# Patient Record
Sex: FEMALE | Race: BLACK OR AFRICAN AMERICAN | Employment: FULL TIME | URBAN - METROPOLITAN AREA
[De-identification: names, ages, dates, MRNs, and addresses within clinical notes are randomized per-mention and may not be internally consistent; named-entity substitution may affect disease eponyms.]

---

## 2020-11-16 ENCOUNTER — TELEPHONE (OUTPATIENT)
Dept: INTERNAL MEDICINE CLINIC | Age: 65
End: 2020-11-16

## 2020-11-16 NOTE — TELEPHONE ENCOUNTER
----- Message from Mahalia Kanner sent at 11/16/2020  2:02 PM EST -----  Subject: Appointment Request    Reason for Call: New Patient Request Appointment    QUESTIONS  Type of Appointment? New Patient/New to Provider  Reason for appointment request? Available appointments did not meet   patient need  Additional Information for Provider? Pt needs a new patient appointment as   soon as possible due to being out of blood pressure medication. Pt would   like a callback. Pt has availability any day besides wednesday 11/18  ---------------------------------------------------------------------------  --------------  CALL BACK INFO  What is the best way for the office to contact you? OK to leave message on   voicemail  Preferred Call Back Phone Number? [phone]  ---------------------------------------------------------------------------  --------------  SCRIPT ANSWERS  Relationship to Patient? Self  Appointment reason? Establish Care/Find a provider  Have you been diagnosed with   tested for   or told that you are suspected of having COVID-19 (Coronavirus)? No  Have you had a fever or taken medication to treat a fever within the past   3 days? No  Have you had a cough   shortness of breath or flu-like symptoms within the past 3 days? No  Do you currently have flu-like symptoms including fever or chills   cough   shortness of breath   or difficulty breathing   or new loss of taste or smell? No  (Service Expert  click yes below to proceed with [email or web address] As Usual   Scheduling)?  Yes

## 2020-11-18 ENCOUNTER — TELEPHONE (OUTPATIENT)
Dept: PRIMARY CARE CLINIC | Age: 65
End: 2020-11-18

## 2020-11-19 NOTE — TELEPHONE ENCOUNTER
----- Message from Ani Pedroza sent at 11/18/2020  4:00 PM EST -----  Subject: Message to Provider    QUESTIONS  Information for Provider? Patient called wanting to schedule a blood   pressure check and wanted an in person visit. Patient is an establishing   patient and is requesting to be seen by next week.   ---------------------------------------------------------------------------  --------------  CALL BACK INFO  What is the best way for the office to contact you? OK to leave message on   voicemail  Preferred Call Back Phone Number? [phone]  ---------------------------------------------------------------------------  --------------  SCRIPT ANSWERS  Relationship to Patient?  Self

## 2020-11-23 ENCOUNTER — OFFICE VISIT (OUTPATIENT)
Dept: INTERNAL MEDICINE CLINIC | Age: 65
End: 2020-11-23
Payer: COMMERCIAL

## 2020-11-23 VITALS
SYSTOLIC BLOOD PRESSURE: 138 MMHG | TEMPERATURE: 97.2 F | OXYGEN SATURATION: 99 % | WEIGHT: 217.25 LBS | BODY MASS INDEX: 34.1 KG/M2 | HEART RATE: 76 BPM | HEIGHT: 67 IN | DIASTOLIC BLOOD PRESSURE: 87 MMHG

## 2020-11-23 DIAGNOSIS — E78.00 PURE HYPERCHOLESTEROLEMIA: ICD-10-CM

## 2020-11-23 DIAGNOSIS — Z11.59 ENCOUNTER FOR HEPATITIS C SCREENING TEST FOR LOW RISK PATIENT: ICD-10-CM

## 2020-11-23 DIAGNOSIS — I10 ESSENTIAL HYPERTENSION: ICD-10-CM

## 2020-11-23 PROBLEM — Z28.21 REFUSED PNEUMOCOCCAL VACCINATION: Status: ACTIVE | Noted: 2020-11-23

## 2020-11-23 PROBLEM — Z28.21 REFUSED INFLUENZA VACCINE: Status: ACTIVE | Noted: 2020-11-23

## 2020-11-23 PROBLEM — J45.20 MILD INTERMITTENT ASTHMA WITHOUT COMPLICATION: Status: ACTIVE | Noted: 2020-11-23

## 2020-11-23 PROBLEM — R60.9 EDEMA: Status: ACTIVE | Noted: 2020-11-23

## 2020-11-23 LAB — HEPATITIS C ANTIBODY INTERPRETATION: NORMAL

## 2020-11-23 PROCEDURE — 99204 OFFICE O/P NEW MOD 45 MIN: CPT | Performed by: INTERNAL MEDICINE

## 2020-11-23 RX ORDER — HYDROCHLOROTHIAZIDE 25 MG/1
25 TABLET ORAL DAILY
Qty: 90 TABLET | Refills: 3 | Status: SHIPPED | OUTPATIENT
Start: 2020-11-23 | End: 2022-01-12 | Stop reason: SDUPTHER

## 2020-11-23 RX ORDER — ALBUTEROL SULFATE 90 UG/1
2 AEROSOL, METERED RESPIRATORY (INHALATION) EVERY 4 HOURS PRN
COMMUNITY
Start: 2020-03-11

## 2020-11-23 RX ORDER — HYDROCHLOROTHIAZIDE 25 MG/1
25 TABLET ORAL DAILY
COMMUNITY
Start: 2020-09-11 | End: 2020-11-23 | Stop reason: SDUPTHER

## 2020-11-23 SDOH — HEALTH STABILITY: MENTAL HEALTH: HOW OFTEN DO YOU HAVE A DRINK CONTAINING ALCOHOL?: NEVER

## 2020-11-23 ASSESSMENT — PATIENT HEALTH QUESTIONNAIRE - PHQ9
2. FEELING DOWN, DEPRESSED OR HOPELESS: 0
SUM OF ALL RESPONSES TO PHQ QUESTIONS 1-9: 0
1. LITTLE INTEREST OR PLEASURE IN DOING THINGS: 0
SUM OF ALL RESPONSES TO PHQ QUESTIONS 1-9: 0
SUM OF ALL RESPONSES TO PHQ9 QUESTIONS 1 & 2: 0
SUM OF ALL RESPONSES TO PHQ QUESTIONS 1-9: 0

## 2020-11-23 NOTE — PROGRESS NOTES
LABA1C  No results found for: EAG    I have extensively reviewed and reconciled the medication list, discontinued medications not taking or no longer appropriate, and updated the active meds list    Prior to Visit Medications    Not on File          ROS: 12 systems reviewed, as documented by MA    History reviewed. No pertinent past medical history. History reviewed. No pertinent surgical history. Social History     Socioeconomic History    Marital status: Not on file     Spouse name: Not on file    Number of children: Not on file    Years of education: Not on file    Highest education level: Not on file   Occupational History    Not on file   Social Needs    Financial resource strain: Not on file    Food insecurity     Worry: Not on file     Inability: Not on file    Transportation needs     Medical: Not on file     Non-medical: Not on file   Tobacco Use    Smoking status: Never Smoker    Smokeless tobacco: Never Used   Substance and Sexual Activity    Alcohol use: Never     Frequency: Never    Drug use: Never    Sexual activity: Not Currently   Lifestyle    Physical activity     Days per week: Not on file     Minutes per session: Not on file    Stress: Not on file   Relationships    Social connections     Talks on phone: Not on file     Gets together: Not on file     Attends Buddhist service: Not on file     Active member of club or organization: Not on file     Attends meetings of clubs or organizations: Not on file     Relationship status: Not on file    Intimate partner violence     Fear of current or ex partner: Not on file     Emotionally abused: Not on file     Physically abused: Not on file     Forced sexual activity: Not on file   Other Topics Concern    Not on file   Social History Narrative    Not on file       History reviewed. No pertinent family history.       Health Maintenance Due   Topic Date Due    Potassium monitoring  1955    Creatinine monitoring  1955    Hepatitis C screen  1955    DTaP/Tdap/Td vaccine (1 - Tdap) 11/20/1973    Lipid screen  11/20/1994    Diabetes screen  11/20/1994    Breast cancer screen  11/20/2004    Shingles Vaccine (1 of 2) 11/20/2004    Colon cancer screen colonoscopy  11/20/2004    DEXA (modify frequency per FRAX score)  11/20/2009    Pneumococcal 65+ years Vaccine (1 of 1 - PPSV23) 11/20/2019    Flu vaccine (1) 09/01/2020         BP (!) 146/87 (Site: Right Upper Arm, Position: Sitting)   Pulse 76   Temp 97.2 °F (36.2 °C) (Infrared)   Ht 5' 6.54\" (1.69 m)   Wt 217 lb 4 oz (98.5 kg)   SpO2 99%   BMI 34.50 kg/m²   Body mass index is 34.5 kg/m². Physical Exam  Constitutional:       General: She is not in acute distress. HENT:      Head: Normocephalic and atraumatic. Nose: Nose normal.      Mouth/Throat:      Pharynx: No oropharyngeal exudate. Eyes:      General: No scleral icterus. Right eye: No discharge. Left eye: No discharge. Conjunctiva/sclera: Conjunctivae normal.      Pupils: Pupils are equal, round, and reactive to light. Neck:      Musculoskeletal: Neck supple. Thyroid: No thyromegaly. Vascular: No JVD. Trachea: No tracheal deviation. Cardiovascular:      Rate and Rhythm: Normal rate and regular rhythm. Heart sounds: Normal heart sounds. No murmur. No friction rub. No gallop. Pulmonary:      Effort: Pulmonary effort is normal. No respiratory distress. Breath sounds: Normal breath sounds. No wheezing or rales. Chest:      Chest wall: No tenderness. Abdominal:      General: Bowel sounds are normal. There is no distension. Palpations: Abdomen is soft. There is no mass. Tenderness: There is no abdominal tenderness. There is no guarding or rebound. Musculoskeletal: Normal range of motion. General: No tenderness. Lymphadenopathy:      Cervical: No cervical adenopathy. Skin:     General: Skin is warm and dry.       Coloration: Skin is not pale. Findings: No erythema or rash. Neurological:      Mental Status: She is alert and oriented to person, place, and time. Cranial Nerves: No cranial nerve deficit. Motor: No abnormal muscle tone. Coordination: Coordination normal.      Deep Tendon Reflexes: Reflexes are normal and symmetric. Reflexes normal.   Psychiatric:         Judgment: Judgment normal.         ASSESSMENT AND PLANS:      Except as noted below, all chronic problems have been reviewed and are stable to continue medications or other therapy as previously documented in the patient's chart, with changes per orders or documentation below:        Assessment and Plan: Patient received counseling and, if relevant,printed instructions for all symptoms listed in CC and HPI, as well as for all diagnoses brought onto today's visit note below. Typical counseling includes, but is not limited to, non pharmacologic measures to manage listed symptoms and conditions; appropriate use, risks and benefits for all prescribed medications; potential interactions between medications both prescribed and OTC; diet; exercise; healthy behaviors; and goalsetting to improve health. Pt.or responsible party was involved in shared decision making and had opportunity to have all questions answered. 1. Essential hypertension--patient reports that she had good control with just hydrochlorothiazide we will plan to restart that and then she will get a home blood pressure monitor and will do a virtual follow-up in 1 month to ensure that she comes under good control. I did advise her that I anticipate she might need additional medication given her readings today.  - COMPREHENSIVE METABOLIC PANEL; Future  - TSH WITH REFLEX TO FT4; Future  - HEMOGLOBIN A1C; Future    2. Pure hypercholesterolemia  - LIPID PANEL; Future    3. Personal history of venous thrombosis and embolism    4. Localized edema    5. Mild intermittent asthma without complication    6. Refused pneumococcal vaccination    7. Refused influenza vaccine    8. Encounter for screening mammogram for malignant neoplasm of breast  - AQUILES DIGITAL SCREEN W OR WO CAD BILATERAL; Future    9. Postmenopausal  - DEXA BONE DENSITY 2 SITES; Future    10. Encounter for hepatitis C screening test for low risk patient  - HEPATITIS C ANTIBODY; Future    11. Screen for colon cancer  - Cologuard (For External Results Only); Future            Problem List     HTN (hypertension) - Primary    Relevant Orders    COMPREHENSIVE METABOLIC PANEL    TSH WITH REFLEX TO FT4    HEMOGLOBIN A1C    Edema    Personal history of venous thrombosis and embolism    Pure hypercholesterolemia    Relevant Medications    hydroCHLOROthiazide (HYDRODIURIL) 25 MG tablet    Other Relevant Orders    LIPID PANEL    Mild intermittent asthma without complication    Relevant Medications    albuterol sulfate  (90 Base) MCG/ACT inhaler    Refused pneumococcal vaccination    Refused influenza vaccine        Orders Placed This Encounter   Procedures    Cologuard (For External Results Only)     This test is performed by an external laboratory and is used for result attachment only. It is required that this order requisition be faxed to: [facility] @ [phone]. See [email or web address] ([email or web address] for further information.      Standing Status:   Future     Standing Expiration Date:   11/23/2021    DEXA BONE DENSITY 2 SITES     Standing Status:   Future     Standing Expiration Date:   11/23/2021    AQUILES DIGITAL SCREEN W OR WO CAD BILATERAL     Standing Status:   Future     Standing Expiration Date:   1/23/2022    LIPID PANEL     Standing Status:   Future     Standing Expiration Date:   11/23/2021     Order Specific Question:   Is Patient Fasting?/# of Hours     Answer:   yes    COMPREHENSIVE METABOLIC PANEL     Standing Status:   Future     Standing Expiration Date:   11/23/2021    TSH WITH REFLEX TO FT4     Standing Status:   Future     Standing Expiration Date:   11/23/2021    HEMOGLOBIN A1C     Standing Status:   Future     Standing Expiration Date:   11/23/2021    HEPATITIS C ANTIBODY     Standing Status:   Future     Standing Expiration Date:   11/23/2021       I have reconciled the medications in chart with what patient reports to be taking, andreviewed action/ sideeffects and how to take any new medications. Patient/caregiver understands purpose and side effects. A complete  list of medications was provided in their after-visit summary. Return in about 1 month (around 12/23/2020) for bp recheck VV. Time basedbilling: I spent over *45 minutes with this patient, and as is the nature of primary care and typical for my extended visits, over 50 percent of this visit was spent on counseling and coordination ofcare.

## 2020-11-24 LAB
A/G RATIO: 1.3 (ref 1.1–2.2)
ALBUMIN SERPL-MCNC: 4.1 G/DL (ref 3.4–5)
ALP BLD-CCNC: 64 U/L (ref 40–129)
ALT SERPL-CCNC: 16 U/L (ref 10–40)
ANION GAP SERPL CALCULATED.3IONS-SCNC: 13 MMOL/L (ref 3–16)
AST SERPL-CCNC: 18 U/L (ref 15–37)
BILIRUB SERPL-MCNC: 0.8 MG/DL (ref 0–1)
BUN BLDV-MCNC: 14 MG/DL (ref 7–20)
CALCIUM SERPL-MCNC: 9.9 MG/DL (ref 8.3–10.6)
CHLORIDE BLD-SCNC: 104 MMOL/L (ref 99–110)
CHOLESTEROL, TOTAL: 258 MG/DL (ref 0–199)
CO2: 29 MMOL/L (ref 21–32)
CREAT SERPL-MCNC: 0.8 MG/DL (ref 0.6–1.2)
ESTIMATED AVERAGE GLUCOSE: 91.1 MG/DL
GFR AFRICAN AMERICAN: >60
GFR NON-AFRICAN AMERICAN: >60
GLOBULIN: 3.1 G/DL
GLUCOSE BLD-MCNC: 95 MG/DL (ref 70–99)
HBA1C MFR BLD: 4.8 %
HDLC SERPL-MCNC: 46 MG/DL (ref 40–60)
LDL CHOLESTEROL CALCULATED: 177 MG/DL
POTASSIUM SERPL-SCNC: 4.6 MMOL/L (ref 3.5–5.1)
SODIUM BLD-SCNC: 146 MMOL/L (ref 136–145)
TOTAL PROTEIN: 7.2 G/DL (ref 6.4–8.2)
TRIGL SERPL-MCNC: 174 MG/DL (ref 0–150)
TSH REFLEX FT4: 1.04 UIU/ML (ref 0.27–4.2)
VLDLC SERPL CALC-MCNC: 35 MG/DL

## 2020-12-28 ENCOUNTER — VIRTUAL VISIT (OUTPATIENT)
Dept: INTERNAL MEDICINE CLINIC | Age: 65
End: 2020-12-28
Payer: COMMERCIAL

## 2020-12-28 PROCEDURE — 99212 OFFICE O/P EST SF 10 MIN: CPT | Performed by: INTERNAL MEDICINE

## 2020-12-28 NOTE — PROGRESS NOTES
Chief Complaint   Patient presents with    Hypertension       HPI: Virtual visit via doxy. me during covid-19 pandemic for bp followup. However, she never got around to purchasing a home monitor. States is feeling well. States adherent to DASH diet. ROS (1+): no cough    Medications reviewed and reconciled with what patient reports to be taking. There were no vitals taken for this visit. Physical Exam well appearing    ASSESSMENT/PLAN: Pt received counseling and, if relevant, printed instructions for all symptoms listed in CC and HPI, as well as for all diagnoses listed below. 1. Essential hypertension--needs to schedule inperson visit since she doesn't have a home monitor. Will need to get one for long term as well. Problem List Items Addressed This Visit     HTN (hypertension) - Primary            Return in about 1 day (around 12/29/2020) for bp recheck in person--doesnt have home monitor. Maria Elena Weems is a 76 y.o. female being evaluated by a Virtual Visit (video visit) encounter to address concerns as mentioned above. A caregiver was present when appropriate. Due to this being a TeleHealth encounter (During UDJDS-76 public health emergency), evaluation of the following organ systems was limited: Vitals/Constitutional/EENT/Resp/CV/GI//MS/Neuro/Skin/Heme-Lymph-Imm. Pursuant to the emergency declaration under the [facility], [address] [place] [address] authority and the [employer] Act, this Virtual Visit was conducted with patient's (and/or legal guardian's) consent, to reduce the patient's risk of exposure to COVID-19 and provide necessary medical care. The patient (and/or legal guardian) has also been advised to contact this office for worsening conditions or problems, and seek emergency medical treatment and/or call 911 if deemed necessary. Patient identification was verified at the start of the visit: Yes    Total time spent for this encounter: Not billed by time    Services were provided through a video synchronous discussion virtually to substitute for in-person clinic visit. Patient and provider were located at their individual homes. --Roni Awad MD on 12/28/2020 at 3:28 PM    An electronic signature was used to authenticate this note.

## 2021-02-26 ENCOUNTER — PATIENT MESSAGE (OUTPATIENT)
Dept: PRIMARY CARE CLINIC | Age: 66
End: 2021-02-26

## 2021-02-26 ENCOUNTER — HOSPITAL ENCOUNTER (OUTPATIENT)
Age: 66
Discharge: HOME | End: 2021-02-26
Payer: COMMERCIAL

## 2021-02-26 ENCOUNTER — HOSPITAL ENCOUNTER (OUTPATIENT)
Dept: GENERAL RADIOLOGY | Age: 66
Discharge: HOME | End: 2021-02-26
Payer: COMMERCIAL

## 2021-02-26 ENCOUNTER — VIRTUAL VISIT (OUTPATIENT)
Dept: PRIMARY CARE CLINIC | Age: 66
End: 2021-02-26
Payer: COMMERCIAL

## 2021-02-26 DIAGNOSIS — Z20.822 SUSPECTED COVID-19 VIRUS INFECTION: Primary | ICD-10-CM

## 2021-02-26 DIAGNOSIS — R07.89 TIGHTNESS IN CHEST: ICD-10-CM

## 2021-02-26 PROCEDURE — 71046 X-RAY EXAM CHEST 2 VIEWS: CPT

## 2021-02-26 PROCEDURE — 99213 OFFICE O/P EST LOW 20 MIN: CPT | Performed by: NURSE PRACTITIONER

## 2021-02-26 RX ORDER — ZINC SULFATE 50(220)MG
50 CAPSULE ORAL DAILY
COMMUNITY
Start: 2021-02-26

## 2021-02-26 RX ORDER — ASCORBIC ACID 500 MG
500 TABLET ORAL 2 TIMES DAILY
COMMUNITY
Start: 2021-02-26

## 2021-02-26 RX ORDER — MELATONIN
1000 DAILY
COMMUNITY
Start: 2021-02-26

## 2021-02-26 ASSESSMENT — PATIENT HEALTH QUESTIONNAIRE - PHQ9
2. FEELING DOWN, DEPRESSED OR HOPELESS: 1
SUM OF ALL RESPONSES TO PHQ QUESTIONS 1-9: 2
SUM OF ALL RESPONSES TO PHQ QUESTIONS 1-9: 2

## 2021-02-26 NOTE — PROGRESS NOTES
2021    TELEHEALTH EVALUATION -- Audio/Visual (During LMWUG-86 public health emergency)    Chief Complaint   Patient presents with    Nasal Congestion    Generalized Body Aches     patient had covid test 2021 and was negative    GI Problem     intermittent nausea    Pharyngitis     postnasal drainage        HPI:    Marilu Yun (: 1955) is an established patient of [facility] ([facility] however new to me. She has requested an audio/video evaluation for the following concern(s): Body aches, sore throat, nasal congestion, fatigue x 1 week. Intermittent GI upset (nausea without vomiting) x 5 days. Recent onset of chest \"tightness, nothing extreme, but notified\". Albuterol HFA improves tightness. She reports conjunctiva \"pink\". She reports postnasal drainage and thick nasal discharge. Denies facial pressure/pain. She tried OTC Coricidin, with some relief. Last dose taken 2-3 days ago. Exposure to ill contact at work prior to onset of symptoms. 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 She had COVID -19 testing on 2021 at [facility]. Negative result reported today. Daughter now with similar symptoms, did not have COVID testing since patient's test was negative. Son is starting to have symptoms. Pfizer COVID-19 vaccine #1 administered on 2021. Second dose is due 2021- will postpone until symptoms have resolved. She denies history of pneumonia. History of influenza in the past.       Review of Systems:  Gen: Denies fever, chills. + body aches. Denies headaches. No weight loss. Decreased appetite, maintaining hydration with a lot of water. HEENT: + cold symptoms, sore throat. Denies changes in taste or smell. CV:  + chest tightness. Denies chest pain or palpitations. Denies sensation of elephant sitting on chest. Denies radiating chest pain. Denies diaphoresis. Pulm: Denies shortness of breath. Denies cough. Denies wheezing.   Abd: Denies abdominal pain. + intermittent nausea. Denies vomiting and diarrhea. Current Outpatient Medications on File Prior to Visit   Medication Sig Dispense Refill    albuterol sulfate  (90 Base) MCG/ACT inhaler Inhale 2 puffs into the lungs every 4 hours as needed      hydroCHLOROthiazide (HYDRODIURIL) 25 MG tablet Take 1 tablet by mouth daily 90 tablet 3     No current facility-administered medications on file prior to visit. History reviewed. No pertinent past medical history. History reviewed. No pertinent surgical history. History reviewed. No pertinent family history. No Known Allergies    Social History     Tobacco Use    Smoking status: Never Smoker    Smokeless tobacco: Never Used   Substance Use Topics    Alcohol use: Never     Frequency: Never    Drug use: Never          PHYSICAL EXAMINATION:  Vital Signs: (As obtained by patient/caregiver or practitioner observation)  There were no vitals taken for this visit. Patient-Reported Vitals 2/26/2021   Patient-Reported Weight 170   Patient-Reported Height 5'6   Patient-Reported Temperature 95.8        Respiratory rate appears normal    Constitutional: Appears tired, non-toxic, in no apparent distress. Well-developed and well-nourished. Mental status: Alert and awake. Oriented to person/place/time. Able to follow commands    Eyes: EOM normal. Sclera normal. No discharge visible  HENT: Normocephalic, atraumatic. Neck: No visualized mass   Pulmonary/Chest: Respiratory effort normal.  No visualized signs of difficulty breathing or respiratory distress. Speaking in full sentences without difficulty. Musculoskeletal: Normal range of motion of neck  Neurological: No Facial Asymmetry (Cranial nerve 7 motor function) (limited exam to video visit). No gaze palsy       Skin: No significant exanthematous lesions or discoloration noted on facial skin       Psychiatric: Normal Affect. No Hallucinations          ASSESSMENT/PLAN:  1. Suspected COVID-19 virus infection  - New. - Continue Coricidin PRN   - Start zinc sulfate (ORAZINC) 220 (50 Zn) MG capsule; Take 1 capsule by mouth daily  - Start ascorbic acid (V-R VITAMIN C) 500 MG tablet; Take 1 tablet by mouth 2 times daily  - Start  vitamin D3 (CHOLECALCIFEROL) 25 MCG (1000 UT) TABS tablet; Take 1 tablet by mouth daily  - Continue quarantine-  Must be without symptoms x 3 days and without a fever x 3 days, and 10 days or greater since onset of symptoms.   - Increase rest.  - Increase fluids (water)    2. Tightness in chest  - New.  - XR CHEST STANDARD (2 VW); Today  - Will call patient with results. Return if symptoms worsen. Emergency Department with shortness of breath, chest pain. Current Outpatient Medications   Medication Sig Dispense Refill    zinc sulfate (ORAZINC) 220 (50 Zn) MG capsule Take 1 capsule by mouth daily      ascorbic acid (V-R VITAMIN C) 500 MG tablet Take 1 tablet by mouth 2 times daily      vitamin D3 (CHOLECALCIFEROL) 25 MCG (1000 UT) TABS tablet Take 1 tablet by mouth daily      albuterol sulfate  (90 Base) MCG/ACT inhaler Inhale 2 puffs into the lungs every 4 hours as needed      hydroCHLOROthiazide (HYDRODIURIL) 25 MG tablet Take 1 tablet by mouth daily 90 tablet 3     No current facility-administered medications for this visit. Claude Orellana is a 76 y.o. female being evaluated by a Virtual Visit (video visit) encounter to address concerns as mentioned above. A caregiver was present when appropriate. Due to this being a TeleHealth encounter (During YVZBA-06 public health emergency), evaluation of the following organ systems was limited: Vitals/Constitutional/EENT/Resp/CV/GI//MS/Neuro/Skin/Heme-Lymph-Imm.   Pursuant to the emergency declaration under the [facility], [address] [place] [address] authority and the [employer] Act, this Virtual Visit was conducted with patient's (and/or legal guardian's)

## 2021-02-26 NOTE — TELEPHONE ENCOUNTER
From: Marilu Yun  To: NORAH Hermosillo - CNP  Sent: 2/26/2021 4:23 PM EST  Subject: Non-Urgent Medical Question    Yes I was reading over the visit summary. At the end it was stated that  I \"denied headaches. \" That was a mistake because I was sure I said I had headaches and sometimes my head just felt \"funny. \"  Thank you. Mrs.D. Jyoti Hoffman.

## 2021-02-26 NOTE — PATIENT INSTRUCTIONS
Patient Education        10 Things to Do When You Have COVID-19    Stay home. Don't go to school, work, or public areas. And don't use public transportation, ride-shares, or taxis unless you have no choice. Leave your home only if you need to get medical care. But call the doctor's office first so they know you're coming. And wear a cloth face cover. Ask before leaving isolation. Talk with your doctor or other health professional about when it will be safe for you to leave isolation. Wear a cloth face cover when you are around other people. It can help stop the spread of the virus when you cough or sneeze. Limit contact with people in your home. If possible, stay in a separate bedroom and use a separate bathroom. Avoid contact with pets and other animals. If possible, have a friend or family member care for them while you're sick. Cover your mouth and nose with a tissue when you cough or sneeze. Then throw the tissue in the trash right away. Wash your hands often, especially after you cough or sneeze. Use soap and water, and scrub for at least 20 seconds. If soap and water aren't available, use an alcohol-based hand . Don't share personal household items. These include bedding, towels, cups and glasses, and eating utensils. Clean and disinfect your home every day. Use household  or disinfectant wipes or sprays. Take special care to clean things that you grab with your hands. These include doorknobs, remote controls, phones, and handles on your refrigerator and microwave. And don't forget countertops, tabletops, bathrooms, and computer keyboards. Take acetaminophen (Tylenol) to relieve fever and body aches. Read and follow all instructions on the label. Current as of: December 18, 2020               Content Version: 12.7  © 2006-2021 Healthwise, Incorporated. Care instructions adapted under license by [facility] ([facility]).  If you have questions about a medical condition or this instruction, always ask your healthcare professional. [address] any warranty or liability for your use of this information. Patient Education        9 Things To Do If You've Been Exposed to COVID-19    Stay home. If you've been exposed, you should stay in quarantine for at least 14 days. Ask your doctor when it's safe to end your quarantine. Don't go to school, work, or public areas. And don't use public transportation, ride-shares, or taxis unless you have no choice. Leave your home only if you need to get medical care. But call the doctor's office first so they know you're coming, and wear a cloth face cover when you go. Call your doctor. Call your doctor or other health professional to let them know that you've been exposed. They might want you to be tested, or they may have other instructions for you. If you become sick, wear a face cover when you are around other people. It can help stop the spread of the virus when you cough or sneeze. Limit contact with people in your home. If possible, stay in a separate bedroom and use a separate bathroom. Avoid contact with pets and other animals. Cover your mouth and nose with a tissue when you cough or sneeze. Then throw it in the trash right away. Wash your hands often, especially after you cough or sneeze. Use soap and water, and scrub for at least 20 seconds. If soap and water aren't available, use an alcohol-based hand . Don't share personal household items. These include bedding, towels, cups and glasses, and eating utensils. Clean and disinfect your home every day. Use household  or disinfectant wipes or sprays. Take special care to clean things that you grab with your hands. These include doorknobs, remote controls, phones, and handles on your refrigerator and microwave. And don't forget countertops, tabletops, bathrooms, and computer keyboards. Current as of: December 18, 2020               Content Version: 12.7  © 7533-2285 [employer]. Care instructions adapted under license by [facility] [place] ([facility]). If you have questions about a medical condition or this instruction, always ask your healthcare professional. [address] any warranty or liability for your use of this information. Patient Education        Coronavirus (ILYUM-46): Care Instructions  Overview  The coronavirus disease (COVID-19) is caused by a virus. Symptoms may include a fever, a cough, and shortness of breath. It mainly spreads person-to-person through droplets from coughing and sneezing. The virus also can spread when people are in close contact with someone who is infected. Most people have mild symptoms and can take care of themselves at home. If their symptoms get worse, they may need care in a hospital. Treatment may include medicines to reduce symptoms, plus breathing support such as oxygen therapy or a ventilator. It's important to not spread the virus to others. If you have COVID-19, wear a face cover anytime you are around other people. You need to isolate yourself while you are sick. Leave your home only if you need to get medical care or testing. Follow-up care is a key part of your treatment and safety. Be sure to make and go to all appointments, and call your doctor if you are having problems. It's also a good idea to know your test results and keep a list of the medicines you take. How can you care for yourself at home? · Get extra rest. It can help you feel better. · Drink plenty of fluids. This helps replace fluids lost from fever. Fluids also help ease a scratchy throat. Water, soup, fruit juice, and hot tea with lemon are good choices. · Take acetaminophen (such as Tylenol) to reduce a fever. It may also help with muscle aches. Read and follow all instructions on the label. · Use petroleum jelly on sore skin.  This can help if the skin around your nose and lips becomes sore from rubbing a lot with tissues. Tips for self-isolation  · Limit contact with people in your home. If possible, stay in a separate bedroom and use a separate bathroom. · Wear a cloth face cover when you are around other people. It can help stop the spread of the virus when you cough or sneeze. · If you have to leave home, avoid crowds and try to stay at least 6 feet away from other people. · Avoid contact with pets and other animals. · Cover your mouth and nose with a tissue when you cough or sneeze. Then throw it in the trash right away. · Wash your hands often, especially after you cough or sneeze. Use soap and water, and scrub for at least 20 seconds. If soap and water aren't available, use an alcohol-based hand . · Don't share personal household items. These include bedding, towels, cups and glasses, and eating utensils. · [address] in the warmest water allowed for the fabric type, and dry it completely. It's okay to wash other people's laundry with yours. · Clean and disinfect your home every day. Use household  and disinfectant wipes or sprays. Take special care to clean things that you grab with your hands. These include doorknobs, remote controls, phones, and handles on your refrigerator and microwave. And don't forget countertops, tabletops, bathrooms, and computer keyboards. When you can end self-isolation  · If you know or suspect that you have COVID-19, stay in self-isolation until:  ? You haven't had a fever for 24 hours while not taking medicines to lower the fever, and  ? Your symptoms have improved, and  ? It's been at least 10 days since your symptoms started. · Talk to your doctor about whether you also need testing, especially if you have a weakened immune system. When should you call for help? Call 911 anytime you think you may need emergency care.  For example, call if you have life-threatening symptoms, such as:    · You have severe trouble breathing. (You can't talk at all.)     · You have constant chest pain or pressure.     · You are severely dizzy or lightheaded.     · You are confused or can't think clearly.     · Your face and lips have a blue color.     · You pass out (lose consciousness) or are very hard to wake up. Call your doctor now or seek immediate medical care if:    · You have moderate trouble breathing. (You can't speak a full sentence.)     · You are coughing up blood (more than about 1 teaspoon).     · You have signs of low blood pressure. These include feeling lightheaded; being too weak to stand; and having cold, pale, clammy skin. Watch closely for changes in your health, and be sure to contact your doctor if:    · Your symptoms get worse.     · You are not getting better as expected. Call before you go to the doctor's office. Follow their instructions. And wear a cloth face cover. Current as of: December 18, 2020               Content Version: 12.7  © 2006-2021 [employer]. Care instructions adapted under license by [facility] ([facility]). If you have questions about a medical condition or this instruction, always ask your healthcare professional. [place] [address] any warranty or liability for your use of this information. Patient Education        Learning About COVID-19 and Social Distancing  What is it? Social distancing means putting space between yourself and other people. The recommended distance is 6 feet, or about 2 meters. This also means staying away from any place where people may gather, such as jensen or other public gathering places. Why is it important? Social distancing is the best way to reduce the spread of COVID-19. This virus seems to spread from person to person through droplets from coughing and sneezing. So if you keep your distance from others, you're less likely to get it or spread it.   And social distancing is important for everyone, not just those who are at high risk of infection, like older people. You might have the virus but not have symptoms. You could then give the infection to someone you come into contact with. How is it done? Putting 6 feet, or about 2 meters, between you and other people is the recommended distance. Also stay away from any place where people may gather, such as jensen or other public gathering places. So if possible:  · Work from home, and keep your kids at home. · Don't travel if you don't have to. And avoid public transportation, ride-shares, and taxis unless you have no choice. · Limit shopping to essentials, like food and medicines. · Wear a cloth face cover if you have to go to a public place like the grocery store or pharmacy. · Don't eat in restaurants. (You can still get takeout or food deliveries.)  · Avoid crowds and busy places. Follow stay-at-home orders or other directions for your area. Where can you learn more? Go to https://[employer]. org and sign in to your [employer] account. Enter A133 in the Wantster box to learn more about \"Learning About COVID-19 and Social Distancing. \"     If you do not have an account, please click on the \"Sign Up Now\" link. Current as of: December 18, 2020               Content Version: 12.7  © 3002-4063 Healthwise, Incorporated. Care instructions adapted under license by [facility] ([facility]). If you have questions about a medical condition or this instruction, always ask your healthcare professional. [place] [address] any warranty or liability for your use of this information.

## 2021-02-26 NOTE — LETTER
[facility]  [address] [place] [id number]  Phone: [phone]  Fax: [phone]    NORAH Navarro CNP        February 26, 2021     Patient: Marichuy Richards   YOB: 1955   Date of Visit: 2/26/2021       To Whom It May Concern: It is my medical opinion that Sherren Philips should remain out of work until March 5, 2021 due to illness. If you have any questions or concerns, please don't hesitate to call.     Sincerely,         NORAH Navarro CNP

## 2021-03-29 ENCOUNTER — E-VISIT (OUTPATIENT)
Dept: INTERNAL MEDICINE CLINIC | Age: 66
End: 2021-03-29
Payer: COMMERCIAL

## 2021-03-29 DIAGNOSIS — U07.1 COVID-19: Primary | ICD-10-CM

## 2021-03-29 PROCEDURE — 99422 OL DIG E/M SVC 11-20 MIN: CPT | Performed by: INTERNAL MEDICINE

## 2021-03-29 ASSESSMENT — LIFESTYLE VARIABLES: SMOKING_STATUS: NO, I HAVE NEVER SMOKED

## 2021-03-30 ENCOUNTER — VIRTUAL VISIT (OUTPATIENT)
Dept: INTERNAL MEDICINE CLINIC | Age: 66
End: 2021-03-30
Payer: COMMERCIAL

## 2021-03-30 DIAGNOSIS — Z20.822 CLOSE EXPOSURE TO COVID-19 VIRUS: ICD-10-CM

## 2021-03-30 DIAGNOSIS — J06.9 VIRAL URI WITH COUGH: Primary | ICD-10-CM

## 2021-03-30 PROCEDURE — 99214 OFFICE O/P EST MOD 30 MIN: CPT | Performed by: INTERNAL MEDICINE

## 2021-03-30 NOTE — PROGRESS NOTES
Chief Complaint   Patient presents with    Cough     flu like symptoms exposed to Covid 19 12 days prior       HPI: Virtual visit via doxy. me during covid-19 pandemic for covid-type symptoms. 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 No fever. Had more severe similar symptoma in Feb, evaluated by NP virtually 2/26/21,  who thought covid like despite negative test and she stayed off full 14 days at that time, had neg CXR. Wants to return to work Friday 4/2. Did not get tested with current illness. Had Pfizer vaccine dose #1 in January but missed second dose due to illness at the time. Medications reviewed and reconciled with what patient reports to be taking. There were no vitals taken for this visit. Physical Exam well appearing, no hoarseness, no cough observed    ASSESSMENT/PLAN: Pt received counseling and, if relevant, printed instructions for all symptoms listed in CC and HPI, as well as for all diagnoses listed below. 1. Viral URI with cough--sounds very likely to be COVID-19 infection. I did recommend that she go for testing even though it is later in her course. Because the test may not be back I think it is unrealistic that she would be back to work by this Friday and Monday is more likely. She did request a MyChart note stating that she had the visit today    2. Close exposure to COVID-19 virus      Problem List Items Addressed This Visit     None      Visit Diagnoses     Viral URI with cough    -  Primary    Close exposure to COVID-19 virus                No follow-ups on file. Hiram Dao, was evaluated through a synchronous (real-time) audio-video encounter. The patient (or guardian if applicable) is aware that this is a billable service. Verbal consent to proceed has been obtained within the past 12 months.  The visit was conducted pursuant to the emergency declaration under the [address]

## 2021-04-20 ENCOUNTER — NURSE TRIAGE (OUTPATIENT)
Dept: OTHER | Facility: CLINIC | Age: 66
End: 2021-04-20

## 2021-04-20 NOTE — TELEPHONE ENCOUNTER
headache, loss of smell or taste, muscle pain, sore throat; new loss of smell or taste especially support the diagnosis of COVID-19)        Chills,fatigue, headache, sleeping a lot, muscle pain    Protocols used: CORONAVIRUS (COVID-19) DIAGNOSED OR SUSPECTED-ADULT-    Pt is calling with concerns of COVID symptoms. She reports a negative test on 4/15/2021 but states she has been in contact with a few people at her workplace who have tested positive. She would like to get a recommendation on retesting. Disposition: Call PCP when office is open. Pt in agreement with this plan. Care advice provided, caller verbalized understanding, will call back with any worsening symptoms, further questions or concerns.

## 2021-04-21 ENCOUNTER — TELEPHONE (OUTPATIENT)
Dept: INTERNAL MEDICINE CLINIC | Age: 66
End: 2021-04-21

## 2021-04-21 NOTE — TELEPHONE ENCOUNTER
Patient calling for advise regarding covid like symptoms. She complains of headache, fatigue, congestion and sore throat. She has been off work due to covid exposure she is supposed to return to work on 4/23/21. She tested negative for Covid on 4/15/21 but does not believe results because she had to self swab through a drive thru clinic. Patient has concerns of returning to work because of symptoms. 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     She will go to an urgent care for another test to confirm, she will treat her symptoms with OTC medications and she is scheduled for a virtual visit at our next available appointment on 4/22/21 at 10:30 am.

## 2021-04-22 ENCOUNTER — VIRTUAL VISIT (OUTPATIENT)
Dept: INTERNAL MEDICINE CLINIC | Age: 66
End: 2021-04-22
Payer: COMMERCIAL

## 2021-04-22 DIAGNOSIS — B34.9 VIRAL ILLNESS: Primary | ICD-10-CM

## 2021-04-22 DIAGNOSIS — Z20.822 SUSPECTED COVID-19 VIRUS INFECTION: ICD-10-CM

## 2021-04-22 DIAGNOSIS — Z20.822 EXPOSURE TO COVID-19 VIRUS: ICD-10-CM

## 2021-04-22 PROCEDURE — 99213 OFFICE O/P EST LOW 20 MIN: CPT | Performed by: INTERNAL MEDICINE

## 2021-04-22 NOTE — PROGRESS NOTES
Chief Complaint   Patient presents with    Headache    Pharyngitis    Fatigue    Congestion       HPI: Virtual visit via doxy. me during covid-19 pandemic for 9-day illness with body aches, sore throat, mild cough and congestion but no fever. Patient states these are mild symptoms and she has not felt that that; however, she was notified of an exposure to a coworker with whom her last contact was April 9 who tested positive on April 13 the same day that her symptoms began. She has had dose one of her COVID-19 vaccine however her dose two has been delayed due to some intercurrent illnesses. Medications reviewed and reconciled with what patient reports to be taking. There were no vitals taken for this visit. Physical Exam well appearing, no cough observed    ASSESSMENT/PLAN: Pt received counseling and, if relevant, printed instructions for all symptoms listed in CC and HPI, as well as for all diagnoses listed below. 1. Viral illness--strongly suspect this is COVID-19 infection which may be milder since she has had her first dose of the Covid vaccine previously. She is pending the results of her second Covid test and we discussed plans for a positive as well as a negative result outcome. Since she is still having some symptoms I think it best that she not return to work sooner than April 26 we will plan we will provide a note in her MyChart for her use. 2. Suspected COVID-19 virus infection    3. Exposure to COVID-19 virus      Problem List Items Addressed This Visit     None      Visit Diagnoses     Viral illness    -  Primary    Suspected COVID-19 virus infection        Exposure to COVID-19 virus                No follow-ups on file. Charlie Zambrano, was evaluated through a synchronous (real-time) audio-video encounter. The patient (or guardian if applicable) is aware that this is a billable service. Verbal consent to proceed has been obtained within the past 12 months.  The visit was conducted pursuant to the emergency declaration under the [facility], [address] [place] [address] authority and the [employer] Act. Patient identification was verified, and a caregiver was present when appropriate. The patient was located in a state where the provider was credentialed to provide care. Total time spent for this encounter: Not billed by time    --Thor Puri MD on 4/22/2021 at 10:54 AM    An electronic signature was used to authenticate this note.

## 2022-01-06 ENCOUNTER — TELEPHONE (OUTPATIENT)
Dept: INTERNAL MEDICINE CLINIC | Age: 67
End: 2022-01-06

## 2022-01-06 NOTE — TELEPHONE ENCOUNTER
Needs a refill on her HydroCHLOROthiazide she uses [facility] on the corner of [address] she can be reached at  [phone] if there are any questions

## 2022-01-06 NOTE — TELEPHONE ENCOUNTER
Requested Prescriptions     Pending Prescriptions Disp Refills    hydroCHLOROthiazide (HYDRODIURIL) 25 MG tablet 90 tablet 0     Sig: Take 1 tablet by mouth daily   Patient requesting a medication refill. [facility]  Next office visit: Visit date not found  Last regular office visit: 4/22/2021    Patient needs an appointment. Can get a refill until appointment.

## 2022-01-12 RX ORDER — HYDROCHLOROTHIAZIDE 25 MG/1
25 TABLET ORAL DAILY
Qty: 30 TABLET | Refills: 0 | Status: SHIPPED | OUTPATIENT
Start: 2022-01-12 | End: 2022-01-12

## 2022-01-17 RX ORDER — HYDROCHLOROTHIAZIDE 25 MG/1
25 TABLET ORAL DAILY
Qty: 30 TABLET | Refills: 0 | Status: SHIPPED | OUTPATIENT
Start: 2022-01-17 | End: 2022-01-18

## 2022-01-17 RX ORDER — HYDROCHLOROTHIAZIDE 25 MG/1
25 TABLET ORAL DAILY
Qty: 30 TABLET | Refills: 0 | Status: SHIPPED | OUTPATIENT
Start: 2022-01-17 | End: 2022-01-17

## 2022-01-17 NOTE — TELEPHONE ENCOUNTER
Requested Prescriptions     Pending Prescriptions Disp Refills    hydroCHLOROthiazide (HYDRODIURIL) 25 MG tablet [Pharmacy Med Name: HYDROCHLOROTHIAZIDE 25MG TABLETS] 90 tablet      Sig: TAKE 1 TABLET BY MOUTH DAILY   Patient requesting a medication refill.   [facility]  Next office visit: 1/28/2022  Last regular office visit: 4/22/2021

## 2022-01-17 NOTE — TELEPHONE ENCOUNTER
Requested Prescriptions     Pending Prescriptions Disp Refills    hydroCHLOROthiazide (HYDRODIURIL) 25 MG tablet [Pharmacy Med Name: HYDROCHLOROTHIAZIDE 25MG TABLETS] 90 tablet      Sig: TAKE 1 TABLET BY MOUTH DAILY     Patient requesting a medication refill.     Next office visit: 1/28/2022    Last regular office visit: 4/22/2021

## 2022-01-18 RX ORDER — HYDROCHLOROTHIAZIDE 25 MG/1
25 TABLET ORAL DAILY
Qty: 30 TABLET | Refills: 0 | Status: SHIPPED | OUTPATIENT
Start: 2022-01-18

## 2022-01-18 NOTE — TELEPHONE ENCOUNTER
Requested Prescriptions     Pending Prescriptions Disp Refills    hydroCHLOROthiazide (HYDRODIURIL) 25 MG tablet [Pharmacy Med Name: HYDROCHLOROTHIAZIDE 25MG TABLETS] 90 tablet 1     Sig: TAKE 1 TABLET BY MOUTH DAILY   Patient requesting a medication refill.   [facility]  Next office visit: 1/28/2022  Last regular office visit: 4/22/2021

## 2022-03-22 RX ORDER — HYDROCHLOROTHIAZIDE 25 MG/1
25 TABLET ORAL DAILY
Qty: 30 TABLET | Refills: 0 | OUTPATIENT
Start: 2022-03-22